# Patient Record
Sex: MALE | Race: WHITE | Employment: OTHER | URBAN - METROPOLITAN AREA
[De-identification: names, ages, dates, MRNs, and addresses within clinical notes are randomized per-mention and may not be internally consistent; named-entity substitution may affect disease eponyms.]

---

## 2022-07-11 ENCOUNTER — OFFICE VISIT (OUTPATIENT)
Dept: FAMILY MEDICINE | Facility: CLINIC | Age: 67
End: 2022-07-11
Payer: MEDICARE

## 2022-07-11 VITALS
SYSTOLIC BLOOD PRESSURE: 116 MMHG | DIASTOLIC BLOOD PRESSURE: 74 MMHG | HEART RATE: 68 BPM | WEIGHT: 208 LBS | OXYGEN SATURATION: 98 % | RESPIRATION RATE: 16 BRPM | TEMPERATURE: 98.1 F | BODY MASS INDEX: 29.12 KG/M2 | HEIGHT: 71 IN

## 2022-07-11 DIAGNOSIS — E11.59 TYPE 2 DIABETES MELLITUS WITH OTHER CIRCULATORY COMPLICATION, WITHOUT LONG-TERM CURRENT USE OF INSULIN (H): Primary | ICD-10-CM

## 2022-07-11 DIAGNOSIS — F41.9 ANXIETY: ICD-10-CM

## 2022-07-11 DIAGNOSIS — K21.00 GASTROESOPHAGEAL REFLUX DISEASE WITH ESOPHAGITIS WITHOUT HEMORRHAGE: ICD-10-CM

## 2022-07-11 DIAGNOSIS — E78.2 MIXED HYPERLIPIDEMIA: ICD-10-CM

## 2022-07-11 DIAGNOSIS — Z72.0 TOBACCO USE: ICD-10-CM

## 2022-07-11 DIAGNOSIS — I25.810 CORONARY ARTERY DISEASE INVOLVING CORONARY BYPASS GRAFT OF NATIVE HEART WITHOUT ANGINA PECTORIS: ICD-10-CM

## 2022-07-11 DIAGNOSIS — I10 HYPERTENSION, UNSPECIFIED TYPE: ICD-10-CM

## 2022-07-11 LAB
ANION GAP SERPL CALCULATED.3IONS-SCNC: 1 MMOL/L (ref 3–14)
BUN SERPL-MCNC: 25 MG/DL (ref 7–30)
CALCIUM SERPL-MCNC: 9.3 MG/DL (ref 8.5–10.1)
CHLORIDE BLD-SCNC: 108 MMOL/L (ref 94–109)
CHOLEST SERPL-MCNC: 193 MG/DL
CO2 SERPL-SCNC: 29 MMOL/L (ref 20–32)
CREAT SERPL-MCNC: 0.92 MG/DL (ref 0.66–1.25)
CREAT UR-MCNC: 57 MG/DL
FASTING STATUS PATIENT QL REPORTED: YES
GFR SERPL CREATININE-BSD FRML MDRD: >90 ML/MIN/1.73M2
GLUCOSE BLD-MCNC: 147 MG/DL (ref 70–99)
HBA1C MFR BLD: 6.8 % (ref 0–5.6)
HDLC SERPL-MCNC: 35 MG/DL
LDLC SERPL CALC-MCNC: 100 MG/DL
MICROALBUMIN UR-MCNC: 10 MG/L
MICROALBUMIN/CREAT UR: 17.54 MG/G CR (ref 0–17)
NONHDLC SERPL-MCNC: 158 MG/DL
POTASSIUM BLD-SCNC: 4.4 MMOL/L (ref 3.4–5.3)
SODIUM SERPL-SCNC: 138 MMOL/L (ref 133–144)
TRIGL SERPL-MCNC: 291 MG/DL
TSH SERPL DL<=0.005 MIU/L-ACNC: 1.27 MU/L (ref 0.4–4)

## 2022-07-11 PROCEDURE — 82043 UR ALBUMIN QUANTITATIVE: CPT | Performed by: FAMILY MEDICINE

## 2022-07-11 PROCEDURE — 99204 OFFICE O/P NEW MOD 45 MIN: CPT | Performed by: FAMILY MEDICINE

## 2022-07-11 PROCEDURE — 83036 HEMOGLOBIN GLYCOSYLATED A1C: CPT | Performed by: FAMILY MEDICINE

## 2022-07-11 PROCEDURE — 84443 ASSAY THYROID STIM HORMONE: CPT | Performed by: FAMILY MEDICINE

## 2022-07-11 PROCEDURE — 36415 COLL VENOUS BLD VENIPUNCTURE: CPT | Performed by: FAMILY MEDICINE

## 2022-07-11 PROCEDURE — 80048 BASIC METABOLIC PNL TOTAL CA: CPT | Performed by: FAMILY MEDICINE

## 2022-07-11 PROCEDURE — 80061 LIPID PANEL: CPT | Performed by: FAMILY MEDICINE

## 2022-07-11 RX ORDER — CARVEDILOL 25 MG/1
25 TABLET ORAL 2 TIMES DAILY
Qty: 180 TABLET | Refills: 3 | Status: SHIPPED | OUTPATIENT
Start: 2022-07-11 | End: 2023-06-26

## 2022-07-11 RX ORDER — SERTRALINE HYDROCHLORIDE 100 MG/1
TABLET, FILM COATED ORAL
Qty: 180 TABLET | Refills: 3 | Status: SHIPPED | OUTPATIENT
Start: 2022-07-11 | End: 2023-06-26

## 2022-07-11 RX ORDER — ATORVASTATIN CALCIUM 20 MG/1
20 TABLET, FILM COATED ORAL DAILY
COMMUNITY
Start: 2022-04-18 | End: 2022-07-11

## 2022-07-11 RX ORDER — METFORMIN HCL 500 MG
TABLET, EXTENDED RELEASE 24 HR ORAL
COMMUNITY
Start: 2022-04-18 | End: 2022-07-11

## 2022-07-11 RX ORDER — LANSOPRAZOLE 30 MG/1
CAPSULE, DELAYED RELEASE ORAL
COMMUNITY
Start: 2022-06-29 | End: 2022-07-11

## 2022-07-11 RX ORDER — ATORVASTATIN CALCIUM 20 MG/1
20 TABLET, FILM COATED ORAL DAILY
Qty: 90 TABLET | Refills: 3 | Status: SHIPPED | OUTPATIENT
Start: 2022-07-11 | End: 2023-06-26

## 2022-07-11 RX ORDER — CARVEDILOL 25 MG/1
25 TABLET ORAL 2 TIMES DAILY
COMMUNITY
Start: 2022-05-13 | End: 2022-07-11

## 2022-07-11 RX ORDER — SERTRALINE HYDROCHLORIDE 100 MG/1
TABLET, FILM COATED ORAL
COMMUNITY
Start: 2022-05-13 | End: 2022-07-11

## 2022-07-11 RX ORDER — LANSOPRAZOLE 30 MG/1
30 CAPSULE, DELAYED RELEASE ORAL DAILY
Qty: 90 CAPSULE | Refills: 3 | Status: SHIPPED | OUTPATIENT
Start: 2022-07-11 | End: 2023-06-26

## 2022-07-11 RX ORDER — METFORMIN HCL 500 MG
TABLET, EXTENDED RELEASE 24 HR ORAL
Qty: 180 TABLET | Refills: 3 | Status: SHIPPED | OUTPATIENT
Start: 2022-07-11 | End: 2023-06-26

## 2022-07-11 ASSESSMENT — ANXIETY QUESTIONNAIRES
2. NOT BEING ABLE TO STOP OR CONTROL WORRYING: NOT AT ALL
GAD7 TOTAL SCORE: 2
GAD7 TOTAL SCORE: 2
7. FEELING AFRAID AS IF SOMETHING AWFUL MIGHT HAPPEN: NOT AT ALL
GAD7 TOTAL SCORE: 2
4. TROUBLE RELAXING: SEVERAL DAYS
7. FEELING AFRAID AS IF SOMETHING AWFUL MIGHT HAPPEN: NOT AT ALL
5. BEING SO RESTLESS THAT IT IS HARD TO SIT STILL: NOT AT ALL
1. FEELING NERVOUS, ANXIOUS, OR ON EDGE: NOT AT ALL
3. WORRYING TOO MUCH ABOUT DIFFERENT THINGS: NOT AT ALL
6. BECOMING EASILY ANNOYED OR IRRITABLE: SEVERAL DAYS
8. IF YOU CHECKED OFF ANY PROBLEMS, HOW DIFFICULT HAVE THESE MADE IT FOR YOU TO DO YOUR WORK, TAKE CARE OF THINGS AT HOME, OR GET ALONG WITH OTHER PEOPLE?: NOT DIFFICULT AT ALL

## 2022-07-11 ASSESSMENT — PATIENT HEALTH QUESTIONNAIRE - PHQ9
SUM OF ALL RESPONSES TO PHQ QUESTIONS 1-9: 1
10. IF YOU CHECKED OFF ANY PROBLEMS, HOW DIFFICULT HAVE THESE PROBLEMS MADE IT FOR YOU TO DO YOUR WORK, TAKE CARE OF THINGS AT HOME, OR GET ALONG WITH OTHER PEOPLE: NOT DIFFICULT AT ALL
SUM OF ALL RESPONSES TO PHQ QUESTIONS 1-9: 1

## 2022-07-11 ASSESSMENT — PAIN SCALES - GENERAL: PAINLEVEL: NO PAIN (0)

## 2022-07-11 NOTE — LETTER
July 11, 2022      Titus Gordon  [address] [place] [address]        Dear ,    We are writing to inform you of your test results.      Labs stable.  We can discuss things in more detail when I see you back in 3-4 months.     Resulted Orders   Albumin Random Urine Quantitative with Creat Ratio   Result Value Ref Range    Creatinine Urine mg/dL 57 mg/dL    Albumin Urine mg/L 10 mg/L    Albumin Urine mg/g Cr 17.54 (H) 0.00 - 17.00 mg/g Cr   TSH with free T4 reflex   Result Value Ref Range    TSH 1.27 0.40 - 4.00 mU/L   Lipid panel reflex to direct LDL Fasting   Result Value Ref Range    Cholesterol 193 <200 mg/dL    Triglycerides 291 (H) <150 mg/dL    Direct Measure HDL 35 (L) >=40 mg/dL    LDL Cholesterol Calculated 100 <=100 mg/dL    Non HDL Cholesterol 158 (H) <130 mg/dL    Patient Fasting > 8hrs? Yes     Narrative    Cholesterol  Desirable:  <200 mg/dL    Triglycerides  Normal:  Less than 150 mg/dL  Borderline High:  150-199 mg/dL  High:  200-499 mg/dL  Very High:  Greater than or equal to 500 mg/dL    Direct Measure HDL  Female:  Greater than or equal to 50 mg/dL   Male:  Greater than or equal to 40 mg/dL    LDL Cholesterol  Desirable:  <100mg/dL  Above Desirable:  100-129 mg/dL   Borderline High:  130-159 mg/dL   High:  160-189 mg/dL   Very High:  >= 190 mg/dL    Non HDL Cholesterol  Desirable:  130 mg/dL  Above Desirable:  130-159 mg/dL  Borderline High:  160-189 mg/dL  High:  190-219 mg/dL  Very High:  Greater than or equal to 220 mg/dL   Basic metabolic panel  (Ca, Cl, CO2, Creat, Gluc, K, Na, BUN)   Result Value Ref Range    Sodium 138 133 - 144 mmol/L    Potassium 4.4 3.4 - 5.3 mmol/L    Chloride 108 94 - 109 mmol/L    Carbon Dioxide (CO2) 29 20 - 32 mmol/L    Anion Gap 1 (L) 3 - 14 mmol/L    Urea Nitrogen 25 7 - 30 mg/dL    Creatinine 0.92 0.66 - 1.25 mg/dL    Calcium 9.3 8.5 - 10.1 mg/dL    Glucose 147 (H) 70 - 99 mg/dL    GFR Estimate >90 >60 mL/min/1.73m2      Comment:      Effective  December 21, 2021 eGFRcr in adults is calculated using the 2021 CKD-EPI creatinine equation which includes age and gender (Mackenzie et al., NEJM, DOI: 10.1056/LJKCuk7135969)   Hemoglobin A1c   Result Value Ref Range    Hemoglobin A1C 6.8 (H) 0.0 - 5.6 %      Comment:      Normal <5.7%   Prediabetes 5.7-6.4%    Diabetes 6.5% or higher     Note: Adopted from ADA consensus guidelines.       If you have any questions or concerns, please call the clinic at the number listed above.       Sincerely,      Vinicius Rick MD

## 2022-07-11 NOTE — PROGRESS NOTES
"  .  ..     S :Titus Gordon is a 67 year old male, new here, establishing care    CAD: bypass 2013.  No issues since.  BP good, on statin.  ASA daily  Hyperlipidemia: stable on statin  Anxiety: improved on chronic zoloft  Gerd: chronic PPI  DM2: metformin only.  Recheck A1C.  Working on wt loss  Tobacco use: mild, quitting soon  Htn: stable on meds      O:/74   Pulse 68   Temp 98.1  F (36.7  C) (Tympanic)   Resp 16   Ht 1.791 m (5' 10.5\")   Wt 94.3 kg (208 lb)   SpO2 98%   BMI 29.42 kg/m    GEN: Alert and oriented, in no acute distress  CV: RRR, no murmur  RESP: lungs clear bilaterally, good effort  EXT: no edema or lesions noted in lower extremities    A: CAD: bypass 2013.  No issues since.    Hyperlipidemia: stable on statin  Anxiety: improved on chronic zoloft  Gerd: chronic PPI, stable   DM2: metformin only.  Recheck A1C.    Tobacco use: mild, quitting soon  Htn: stable on meds    P: fills.  Labs.  Back in 3 months.  Getting cologuard records.  Is going to quit smoking    Weight management plan: diet/exercise    Tobacco Cessation Action Plan: quitting soon           "

## 2022-10-12 ENCOUNTER — OFFICE VISIT (OUTPATIENT)
Dept: FAMILY MEDICINE | Facility: CLINIC | Age: 67
End: 2022-10-12
Payer: MEDICARE

## 2022-10-12 VITALS
TEMPERATURE: 98.5 F | OXYGEN SATURATION: 98 % | HEIGHT: 71 IN | HEART RATE: 70 BPM | DIASTOLIC BLOOD PRESSURE: 70 MMHG | BODY MASS INDEX: 30.52 KG/M2 | WEIGHT: 218 LBS | RESPIRATION RATE: 16 BRPM | SYSTOLIC BLOOD PRESSURE: 120 MMHG

## 2022-10-12 DIAGNOSIS — I10 HYPERTENSION, UNSPECIFIED TYPE: ICD-10-CM

## 2022-10-12 DIAGNOSIS — Z12.5 SCREENING FOR PROSTATE CANCER: ICD-10-CM

## 2022-10-12 DIAGNOSIS — E11.59 TYPE 2 DIABETES MELLITUS WITH OTHER CIRCULATORY COMPLICATION, WITHOUT LONG-TERM CURRENT USE OF INSULIN (H): Primary | ICD-10-CM

## 2022-10-12 DIAGNOSIS — Z72.0 TOBACCO USE: ICD-10-CM

## 2022-10-12 LAB — HBA1C MFR BLD: 7.5 % (ref 0–5.6)

## 2022-10-12 PROCEDURE — 99214 OFFICE O/P EST MOD 30 MIN: CPT | Performed by: FAMILY MEDICINE

## 2022-10-12 PROCEDURE — 36415 COLL VENOUS BLD VENIPUNCTURE: CPT | Performed by: FAMILY MEDICINE

## 2022-10-12 PROCEDURE — 83036 HEMOGLOBIN GLYCOSYLATED A1C: CPT | Performed by: FAMILY MEDICINE

## 2022-10-12 ASSESSMENT — PAIN SCALES - GENERAL: PAINLEVEL: NO PAIN (0)

## 2022-10-12 NOTE — LETTER
October 12, 2022      Titus BRI Gordon  [address]   [place] [address]        Dear ,    We are writing to inform you of your test results.    A1C took a little jump, so keep working hard on being active, eating healthy foods, and keeping the weight down     We'll recheck in 4-5 months and see how things look, may need to increase the metformin if going in the wrong direction.         Resulted Orders   HEMOGLOBIN A1C   Result Value Ref Range    Hemoglobin A1C 7.5 (H) 0.0 - 5.6 %      Comment:      Normal <5.7%   Prediabetes 5.7-6.4%    Diabetes 6.5% or higher     Note: Adopted from ADA consensus guidelines.       If you have any questions or concerns, please call the clinic at the number listed above.       Sincerely,      Vinicius Rick MD

## 2022-10-12 NOTE — PROGRESS NOTES
"S: Titus Gordon is a 67 year old male with     DM2: mild microalbmuin, hx CAD.  Doing well.  Recheck A1C.  Htn: stable, continue meds  Tobacco use: ongoing, getting ready to quit     Does oguard through [employer], in study,  Will bring in results  Has been negative    O:/70   Pulse 70   Temp 98.5  F (36.9  C) (Tympanic)   Resp 16   Ht 1.791 m (5' 10.5\")   Wt 98.9 kg (218 lb)   SpO2 98%   BMI 30.84 kg/m    GEN: Alert and oriented, in no acute distress  CV: RRR, no murmur  RESP: lungs clear bilaterally, good effort  EXT: no edema or lesions noted in lower extremities    A: DM2: mild microalbmuin, recheck  Htn: stable, continue meds  Tobacco use: ongoing    P: update A1C.  Counseled on cutting down.  Back in 4-5 months, getting him on q6 mo visit schedule.     Discussed risks and benefits of PSA screening with the patient, attempting to engage in shared decision making.  Discussed the high likelihood of further invasive evaluation if the PSA is elevated.  Discussed inability to reliably distinguish aggressive from indolent cancer on biopsy. Explained that PSA screeing and aggressive treatment for biopsy confirmed cancer may extend life in those patients harboring an aggressive form of prostate cancer, but will overtreat the overwhelming majority of men because of the high rate of indolent cancer.  We discussed that treatment of prostate cancer is not benign, and that significant side effects are a real possibility.  We discussed current USPSTF recommendations against any routine screening for prostate cancer.                 Given the above information, the patient elected not to procede with PSA screeing at this time.  He understands he can revisit the discussion at any time.       Weight management plan: .dietsize    Tobacco Cessation Action Plan: working on cutting down      "

## 2023-02-06 ENCOUNTER — TELEPHONE (OUTPATIENT)
Dept: FAMILY MEDICINE | Facility: CLINIC | Age: 68
End: 2023-02-06
Payer: MEDICARE

## 2023-02-06 NOTE — TELEPHONE ENCOUNTER
Patient Quality Outreach    Patient is due for the following:   Diabetes -  Eye Exam and Diabetic Follow-Up Visit  Colon Cancer Screening    Next Steps:   Schedule a office visit for Diabtes     Type of outreach:    Phone, left message for patient/parent to call back.      Questions for provider review:    None     Laura López MA

## 2023-02-13 ENCOUNTER — OFFICE VISIT (OUTPATIENT)
Dept: FAMILY MEDICINE | Facility: CLINIC | Age: 68
End: 2023-02-13
Payer: MEDICARE

## 2023-02-13 VITALS
RESPIRATION RATE: 16 BRPM | HEART RATE: 74 BPM | HEIGHT: 71 IN | BODY MASS INDEX: 29.96 KG/M2 | WEIGHT: 214 LBS | OXYGEN SATURATION: 98 % | DIASTOLIC BLOOD PRESSURE: 82 MMHG | SYSTOLIC BLOOD PRESSURE: 138 MMHG | TEMPERATURE: 98.5 F

## 2023-02-13 DIAGNOSIS — E11.59 TYPE 2 DIABETES MELLITUS WITH OTHER CIRCULATORY COMPLICATION, WITHOUT LONG-TERM CURRENT USE OF INSULIN (H): Primary | ICD-10-CM

## 2023-02-13 DIAGNOSIS — Z12.11 SPECIAL SCREENING FOR MALIGNANT NEOPLASMS, COLON: ICD-10-CM

## 2023-02-13 DIAGNOSIS — K21.00 GASTROESOPHAGEAL REFLUX DISEASE WITH ESOPHAGITIS WITHOUT HEMORRHAGE: ICD-10-CM

## 2023-02-13 DIAGNOSIS — E78.2 MIXED HYPERLIPIDEMIA: ICD-10-CM

## 2023-02-13 DIAGNOSIS — I25.810 CORONARY ARTERY DISEASE INVOLVING CORONARY BYPASS GRAFT OF NATIVE HEART WITHOUT ANGINA PECTORIS: ICD-10-CM

## 2023-02-13 DIAGNOSIS — F10.21 HISTORY OF ALCOHOLISM (H): ICD-10-CM

## 2023-02-13 DIAGNOSIS — F41.9 ANXIETY: ICD-10-CM

## 2023-02-13 DIAGNOSIS — I10 HYPERTENSION, UNSPECIFIED TYPE: ICD-10-CM

## 2023-02-13 LAB — HBA1C MFR BLD: 7.5 % (ref 0–5.6)

## 2023-02-13 PROCEDURE — 36415 COLL VENOUS BLD VENIPUNCTURE: CPT | Performed by: FAMILY MEDICINE

## 2023-02-13 PROCEDURE — 99214 OFFICE O/P EST MOD 30 MIN: CPT | Performed by: FAMILY MEDICINE

## 2023-02-13 PROCEDURE — 83036 HEMOGLOBIN GLYCOSYLATED A1C: CPT | Performed by: FAMILY MEDICINE

## 2023-02-13 ASSESSMENT — PAIN SCALES - GENERAL: PAINLEVEL: NO PAIN (0)

## 2023-02-13 NOTE — PROGRESS NOTES
"S: Titus Gordon is a 67 year old male with DM2, 500mg bid metformin. Recheck    Some neuropathy, hx cad and bypass as well    CAD: hx bypass.  No cp or sob    Htn: stable on meds    Hyperlipidemia: statin, stable    Tobacco use: ongoing, cutting down    Anxiety: better on zoloft, fills    Gerd: chronic PPI, stable    Hx alcoholism: AA since 2020, sober.  doing well.      O:/82   Pulse 74   Temp 98.5  F (36.9  C) (Tympanic)   Resp 16   Ht 1.791 m (5' 10.5\")   Wt 97.1 kg (214 lb)   SpO2 98%   BMI 30.27 kg/m    GEN: Alert and oriented, in no acute distress  CV: RRR, no murmur  RESP: lungs clear bilaterally, good effort  EXT: no edema or lesions noted in lower extremities    A:  DM2, . Recheck, Some neuropathy, hx cad and bypass as well  CAD: hx bypass.  No cp or sob  Htn: stable on meds  Hyperlipidemia: statin, stable  Tobacco use: ongoing, cutting down  Anxiety: better on zoloft, fills  Gerd: chronic PPI, stable      P: update A1C.  Willing to do FIT.  Keep working on cutting down.      Tobacco Cessation Action Plan: cutting down      Answers for HPI/ROS submitted by the patient on 2/13/2023  Do you check your blood pressure regularly outside of the clinic?: No  Are your blood pressures ever more than 140 on the top number (systolic) OR more than 90 on the bottom number (diastolic)? (For example, greater than 140/90): No  Are you following a low salt diet?: Yes  Frequency of checking blood sugars:: not at all  Diabetic concerns:: none  Paraesthesia present:: numbness in feet, blurry vision  Have you had a diabetic eye exam within the last year?: No  Nitroglycerin use:: never  Do you take an aspirin every day?: Yes      "

## 2023-02-13 NOTE — LETTER
February 17, 2023      Titus W Heidi  [address] [place] [address]        Dear ,    We are writing to inform you of your test results.    Stool blood test showed no blood.  That's good news and makes it less likely you would have a colon cancer.  As we talked about, however, colonoscopy is still recommended as it's a better test.  If you change your mind and would like to get a colonoscopy, you can call in any time and we can coordinate things over the phone.         Resulted Orders   Fecal colorectal cancer screen (FIT)   Result Value Ref Range    Occult Blood Screen FIT Negative Negative   HEMOGLOBIN A1C   Result Value Ref Range    Hemoglobin A1C 7.5 (H) 0.0 - 5.6 %      Comment:      Normal <5.7%   Prediabetes 5.7-6.4%    Diabetes 6.5% or higher     Note: Adopted from ADA consensus guidelines.       If you have any questions or concerns, please call the clinic at the number listed above.       Sincerely,      Vinicius Rick MD

## 2023-02-13 NOTE — LETTER
February 13, 2023      Titus Gordon  [address]   [place] [address]        Dear ,    We are writing to inform you of your test results.    Diabetes holding steady.    Resulted Orders   HEMOGLOBIN A1C   Result Value Ref Range    Hemoglobin A1C 7.5 (H) 0.0 - 5.6 %      Comment:      Normal <5.7%   Prediabetes 5.7-6.4%    Diabetes 6.5% or higher     Note: Adopted from ADA consensus guidelines.       If you have any questions or concerns, please call the clinic at the number listed above.       Sincerely,      Vinicius Rick MD

## 2023-02-16 LAB — HEMOCCULT STL QL IA: NEGATIVE

## 2023-02-16 PROCEDURE — 82274 ASSAY TEST FOR BLOOD FECAL: CPT | Performed by: FAMILY MEDICINE

## 2023-05-01 ENCOUNTER — TELEPHONE (OUTPATIENT)
Dept: FAMILY MEDICINE | Facility: CLINIC | Age: 68
End: 2023-05-01
Payer: MEDICARE

## 2023-05-01 NOTE — TELEPHONE ENCOUNTER
Patient Quality Outreach    Patient is due for the following:   Diabetes -  Diabetic Follow-Up Visit    Next Steps:   Schedule a office visit for diabets     Type of outreach:    Phone, left message for patient/parent to call back.      Questions for provider review:    None     Laura López MA

## 2023-05-15 ENCOUNTER — TRANSFERRED RECORDS (OUTPATIENT)
Dept: HEALTH INFORMATION MANAGEMENT | Facility: CLINIC | Age: 68
End: 2023-05-15

## 2023-05-23 ENCOUNTER — OFFICE VISIT (OUTPATIENT)
Dept: FAMILY MEDICINE | Facility: CLINIC | Age: 68
End: 2023-05-23
Payer: MEDICARE

## 2023-05-23 VITALS
DIASTOLIC BLOOD PRESSURE: 58 MMHG | RESPIRATION RATE: 16 BRPM | WEIGHT: 207 LBS | TEMPERATURE: 97.9 F | OXYGEN SATURATION: 95 % | SYSTOLIC BLOOD PRESSURE: 100 MMHG | HEART RATE: 66 BPM | BODY MASS INDEX: 28.98 KG/M2 | HEIGHT: 71 IN

## 2023-05-23 DIAGNOSIS — E78.1 HYPERTRIGLYCERIDEMIA: ICD-10-CM

## 2023-05-23 DIAGNOSIS — I25.810 CORONARY ARTERY DISEASE INVOLVING CORONARY BYPASS GRAFT OF NATIVE HEART WITHOUT ANGINA PECTORIS: ICD-10-CM

## 2023-05-23 DIAGNOSIS — E11.59 TYPE 2 DIABETES MELLITUS WITH OTHER CIRCULATORY COMPLICATION, WITHOUT LONG-TERM CURRENT USE OF INSULIN (H): ICD-10-CM

## 2023-05-23 DIAGNOSIS — E78.5 HYPERLIPIDEMIA LDL GOAL <70: ICD-10-CM

## 2023-05-23 DIAGNOSIS — I25.719 CORONARY ARTERY DISEASE INVOLVING AUTOLOGOUS VEIN CORONARY BYPASS GRAFT WITH ANGINA PECTORIS (H): Primary | ICD-10-CM

## 2023-05-23 LAB
ALBUMIN SERPL BCG-MCNC: 4.9 G/DL (ref 3.5–5.2)
ALP SERPL-CCNC: 87 U/L (ref 40–129)
ALT SERPL W P-5'-P-CCNC: 27 U/L (ref 10–50)
ANION GAP SERPL CALCULATED.3IONS-SCNC: 14 MMOL/L (ref 7–15)
AST SERPL W P-5'-P-CCNC: 20 U/L (ref 10–50)
BILIRUB SERPL-MCNC: 0.4 MG/DL
BUN SERPL-MCNC: 27.9 MG/DL (ref 8–23)
CALCIUM SERPL-MCNC: 9.3 MG/DL (ref 8.8–10.2)
CHLORIDE SERPL-SCNC: 105 MMOL/L (ref 98–107)
CREAT SERPL-MCNC: 1.2 MG/DL (ref 0.67–1.17)
DEPRECATED HCO3 PLAS-SCNC: 20 MMOL/L (ref 22–29)
GFR SERPL CREATININE-BSD FRML MDRD: 66 ML/MIN/1.73M2
GLUCOSE SERPL-MCNC: 119 MG/DL (ref 70–99)
HBA1C MFR BLD: 7.1 % (ref 0–5.6)
POTASSIUM SERPL-SCNC: 4.5 MMOL/L (ref 3.4–5.3)
PROT SERPL-MCNC: 7.3 G/DL (ref 6.4–8.3)
SODIUM SERPL-SCNC: 139 MMOL/L (ref 136–145)

## 2023-05-23 PROCEDURE — 36415 COLL VENOUS BLD VENIPUNCTURE: CPT | Performed by: STUDENT IN AN ORGANIZED HEALTH CARE EDUCATION/TRAINING PROGRAM

## 2023-05-23 PROCEDURE — 99214 OFFICE O/P EST MOD 30 MIN: CPT | Performed by: STUDENT IN AN ORGANIZED HEALTH CARE EDUCATION/TRAINING PROGRAM

## 2023-05-23 PROCEDURE — 80053 COMPREHEN METABOLIC PANEL: CPT | Performed by: STUDENT IN AN ORGANIZED HEALTH CARE EDUCATION/TRAINING PROGRAM

## 2023-05-23 PROCEDURE — 83036 HEMOGLOBIN GLYCOSYLATED A1C: CPT | Performed by: STUDENT IN AN ORGANIZED HEALTH CARE EDUCATION/TRAINING PROGRAM

## 2023-05-23 RX ORDER — PREDNISOLONE ACETATE 10 MG/ML
SUSPENSION/ DROPS OPHTHALMIC
COMMUNITY
Start: 2023-04-21 | End: 2023-08-01

## 2023-05-23 RX ORDER — CLOPIDOGREL BISULFATE 75 MG/1
75 TABLET ORAL
COMMUNITY
Start: 2023-05-18 | End: 2023-06-26

## 2023-05-23 RX ORDER — LISINOPRIL 2.5 MG/1
2.5 TABLET ORAL
COMMUNITY
Start: 2023-05-17 | End: 2023-06-16

## 2023-05-23 RX ORDER — ROSUVASTATIN CALCIUM 10 MG/1
20 TABLET, COATED ORAL
COMMUNITY
Start: 2023-05-17 | End: 2023-06-26

## 2023-05-23 RX ORDER — OFLOXACIN 3 MG/ML
SOLUTION/ DROPS OPHTHALMIC
COMMUNITY
Start: 2023-04-21 | End: 2023-08-01

## 2023-05-23 ASSESSMENT — PAIN SCALES - GENERAL: PAINLEVEL: NO PAIN (0)

## 2023-05-23 NOTE — PROGRESS NOTES
Assessment & Plan     Coronary artery disease involving autologous vein coronary bypass graft with angina pectoris (H)  Coronary artery disease involving coronary bypass graft of native heart without angina pectoris  Seen in [place] for acute MI of previous CABG vein. S/p 2 TRA placement. Danial Millard medtonic TRA. Will scan card with lot information. Will refer to cardiology to establish care here. Started on low dose lisinopril, aspirin, plavix. Switched from atorvastatin to rosuvastatin. Supposed to be started on zetia, but doesn't appear to have been ordered on discharge.   - Adult Cardiology Eval  Referral    Type 2 diabetes mellitus with other circulatory complication, without long-term current use of insulin (H)  Due for a1c. A1c improved to 7.1 from 7.5 previously. Still not quite at goal. Follow up with PCP as previously scheduled.   - HEMOGLOBIN A1C    Hyperlipidemia LDL goal <70  Hypertriglyceridemia  Has mixed hyperlipidemia/hypertrigs. Has previously been on lipitor, but states he had elevated trigs with that. However, appears he is still on atorvastatin. During hospital stay, was switched to crestor. For now, will keep on crestor. Regardless, will ideally have higher dose given elevated lipids completed at outside facility. Also was to be started on Zetia at hospital stay, but doesn't appear this had happened. For now, will defer to cardiology. CMP completed today to follow up on renal function given recent dye exposure, as well as to monitor LFTs on statins.     Lipid result from recent hospital stay:   Trigs: 951  Cholesterol: 221  HDL 27  LDL 93    - Adult Cardiology Eval  Referral  - Comprehensive metabolic panel      Review of prior external note(s) from - CareEverywhere information from [facility] reviewed  Review of the result(s) of each unique test - 5/16/23 and 5/17/23 labs from [facility] - lipid, CBC, BMP  I spent a total of 35 minutes on the day  "of the visit.   Time spent by me doing chart review, history and exam, documentation and further activities per the note    MED REC REQUIRED  Post Medication Reconciliation Status:       BMI:   Estimated body mass index is 29.28 kg/m  as calculated from the following:    Height as of this encounter: 1.791 m (5' 10.5\").    Weight as of this encounter: 93.9 kg (207 lb).     Silvia Raymond MD  [facility]    Josh Qureshi is a 68 year old, presenting for the following health issues:  Chief Complaint   Patient presents with     Hospital F/U     [facility]   Hospital Follow-up Visit:  Hospital/Nursing Home/IP Rehab Facility: [facility]  Date of Admission: 05/15/2023  Date of Discharge: 05/17/2023  Reason(s) for Admission: NSTEMI    Was your hospitalization related to COVID-19? No   Problems taking medications regularly:  None  Medication changes since discharge: None  Problems adhering to non-medication therapy:  None    Summary of hospitalization:  CareEverywhere information obtained and reviewed  Diagnostic Tests/Treatments reviewed.  Follow up needed: [facility]  Other Healthcare Providers Involved in Patient s Care:         None  Update since discharge: improved. With Lipitor had elevated LFTs. This was 20 years ago       Plan of care communicated with patient     Review of Systems   Constitutional, HEENT, cardiovascular, pulmonary, GI, , musculoskeletal, neuro, skin, endocrine and psych systems are negative, except as otherwise noted.      Objective    /58 (BP Location: Right arm, Patient Position: Sitting, Cuff Size: Adult Regular)   Pulse 66   Temp 97.9  F (36.6  C) (Tympanic)   Resp 16   Ht 1.791 m (5' 10.5\")   Wt 93.9 kg (207 lb)   SpO2 95%   BMI 29.28 kg/m    Body mass index is 29.28 kg/m .  Physical Exam  Constitutional:       Appearance: Normal appearance.   HENT:      Head: Normocephalic.   Eyes:      General: No scleral icterus.     Extraocular Movements: " Extraocular movements intact.      Conjunctiva/sclera: Conjunctivae normal.   Cardiovascular:      Rate and Rhythm: Normal rate and regular rhythm.   Pulmonary:      Effort: Pulmonary effort is normal.      Breath sounds: Normal breath sounds.   Musculoskeletal:      Right lower leg: No edema.      Left lower leg: No edema.   Neurological:      General: No focal deficit present.      Mental Status: He is alert and oriented to person, place, and time.             Results from this visit  Results for orders placed or performed in visit on 05/23/23   HEMOGLOBIN A1C     Status: Abnormal   Result Value Ref Range    Hemoglobin A1C 7.1 (H) 0.0 - 5.6 %

## 2023-06-01 ENCOUNTER — HEALTH MAINTENANCE LETTER (OUTPATIENT)
Age: 68
End: 2023-06-01

## 2023-06-01 ENCOUNTER — TRANSFERRED RECORDS (OUTPATIENT)
Dept: MULTI SPECIALTY CLINIC | Facility: CLINIC | Age: 68
End: 2023-06-01

## 2023-06-01 LAB — RETINOPATHY: NORMAL

## 2023-06-26 ENCOUNTER — OFFICE VISIT (OUTPATIENT)
Dept: FAMILY MEDICINE | Facility: CLINIC | Age: 68
End: 2023-06-26
Payer: MEDICARE

## 2023-06-26 VITALS
BODY MASS INDEX: 29.12 KG/M2 | OXYGEN SATURATION: 98 % | DIASTOLIC BLOOD PRESSURE: 76 MMHG | RESPIRATION RATE: 16 BRPM | SYSTOLIC BLOOD PRESSURE: 128 MMHG | WEIGHT: 208 LBS | HEIGHT: 71 IN | HEART RATE: 76 BPM | TEMPERATURE: 98.5 F

## 2023-06-26 DIAGNOSIS — K21.00 GASTROESOPHAGEAL REFLUX DISEASE WITH ESOPHAGITIS WITHOUT HEMORRHAGE: ICD-10-CM

## 2023-06-26 DIAGNOSIS — F10.21 HISTORY OF ALCOHOLISM (H): ICD-10-CM

## 2023-06-26 DIAGNOSIS — E78.2 MIXED HYPERLIPIDEMIA: ICD-10-CM

## 2023-06-26 DIAGNOSIS — Z72.0 TOBACCO USE: ICD-10-CM

## 2023-06-26 DIAGNOSIS — E11.59 TYPE 2 DIABETES MELLITUS WITH OTHER CIRCULATORY COMPLICATION, WITHOUT LONG-TERM CURRENT USE OF INSULIN (H): Primary | ICD-10-CM

## 2023-06-26 DIAGNOSIS — I10 HYPERTENSION, UNSPECIFIED TYPE: ICD-10-CM

## 2023-06-26 DIAGNOSIS — F41.9 ANXIETY: ICD-10-CM

## 2023-06-26 DIAGNOSIS — I25.810 CORONARY ARTERY DISEASE INVOLVING CORONARY BYPASS GRAFT OF NATIVE HEART WITHOUT ANGINA PECTORIS: ICD-10-CM

## 2023-06-26 PROCEDURE — 99214 OFFICE O/P EST MOD 30 MIN: CPT | Performed by: FAMILY MEDICINE

## 2023-06-26 RX ORDER — METFORMIN HCL 500 MG
TABLET, EXTENDED RELEASE 24 HR ORAL
Qty: 180 TABLET | Refills: 3 | Status: SHIPPED | OUTPATIENT
Start: 2023-06-26 | End: 2024-09-09

## 2023-06-26 RX ORDER — CARVEDILOL 25 MG/1
25 TABLET ORAL 2 TIMES DAILY
Qty: 180 TABLET | Refills: 3 | Status: SHIPPED | OUTPATIENT
Start: 2023-06-26 | End: 2024-06-24

## 2023-06-26 RX ORDER — LANSOPRAZOLE 30 MG/1
30 CAPSULE, DELAYED RELEASE ORAL DAILY
Qty: 90 CAPSULE | Refills: 3 | Status: SHIPPED | OUTPATIENT
Start: 2023-06-26 | End: 2023-10-03

## 2023-06-26 RX ORDER — SERTRALINE HYDROCHLORIDE 100 MG/1
TABLET, FILM COATED ORAL
Qty: 180 TABLET | Refills: 3 | Status: SHIPPED | OUTPATIENT
Start: 2023-06-26

## 2023-06-26 RX ORDER — CLOPIDOGREL BISULFATE 75 MG/1
75 TABLET ORAL DAILY
Qty: 90 TABLET | Refills: 3 | Status: SHIPPED | OUTPATIENT
Start: 2023-06-26 | End: 2023-08-14

## 2023-06-26 RX ORDER — ATORVASTATIN CALCIUM 20 MG/1
20 TABLET, FILM COATED ORAL DAILY
Qty: 90 TABLET | Refills: 3 | Status: SHIPPED | OUTPATIENT
Start: 2023-06-26 | End: 2023-08-01

## 2023-06-26 RX ORDER — ROSUVASTATIN CALCIUM 10 MG/1
20 TABLET, COATED ORAL DAILY
Qty: 90 TABLET | Refills: 3 | Status: CANCELLED | OUTPATIENT
Start: 2023-06-26

## 2023-06-26 ASSESSMENT — PAIN SCALES - GENERAL: PAINLEVEL: NO PAIN (0)

## 2023-06-26 NOTE — PROGRESS NOTES
"S: Titus Gordon is a 68 year old male with DM2.  Controlled on metformin.  Recent stents, hx bypass as circulatory issues    Htn: stable on meds    Hyperlipidemia: he has lipitor at home, just assume stay on that.      Gerd: PPI long term,  Stable, fills    Anxiety: stable on zoloft, fills    Tobacco use: almost quit.  kknows it's important.    Hx alcoholism: sober since 2020    Current Outpatient Medications   Medication     aspirin (ASA) 81 MG EC tablet     atorvastatin (LIPITOR) 20 MG tablet     carvedilol (COREG) 25 MG tablet     clopidogrel (PLAVIX) 75 MG tablet     LANsoprazole (PREVACID) 30 MG DR capsule     lisinopril (ZESTRIL) 2.5 MG tablet     metFORMIN (GLUCOPHAGE XR) 500 MG 24 hr tablet     sertraline (ZOLOFT) 100 MG tablet     ofloxacin (OCUFLOX) 0.3 % ophthalmic solution     prednisoLONE acetate (PRED FORTE) 1 % ophthalmic suspension     No current facility-administered medications for this visit.        O:/76   Pulse 76   Temp 98.5  F (36.9  C) (Tympanic)   Resp 16   Ht 1.791 m (5' 10.5\")   Wt 94.3 kg (208 lb)   SpO2 98%   BMI 29.42 kg/m     GEN: Alert and oriented, in no acute distress  CV: RRR, no murmur  RESP: lungs clear bilaterally, good effort  EXT: no edema or lesions noted in lower extremities    A;  DM2.  Controlled on metformin.  Recent stents, hx bypass as circulatory issues  Cad: see above   Htn: stable on meds  Hyperlipidemia: he has lipitor at home, just assume stay on that.    Gerd: PPI long term,  Stable, fills  Anxiety: stable on zoloft, fills  Tobacco use: almost quit.  kknows it's important.  Hx alcoholism: sober since 2020    P:  fills on meds for chronic issues as above in med list and orders.   Continue medications for medical issues as above in med list and orders      plavix for a year    Back in 6 mo for diabetes .    Tobacco Cessation Action Plan: almost there, is motivated          "

## 2023-07-12 ENCOUNTER — TELEPHONE (OUTPATIENT)
Dept: FAMILY MEDICINE | Facility: CLINIC | Age: 68
End: 2023-07-12
Payer: MEDICARE

## 2023-07-12 NOTE — TELEPHONE ENCOUNTER
Call from Jayleen with [facility] transferred to author  Jayleen was attempting to reach the [facility] Team   Jayleen's call back: [phone]  Fax: [phone]    Jayleen calling as patient is going to have a procedure at their clinic for dental work  Patient in the office today for a consultation  Due to patient's history of a recent MI with stent placement in May   Their policy is to not do any procedure/treatment until it has been 6 months post MI/cardiac procedure     Patient states during consultation his provider had cleared him for having the procedure   [facility] is needing a letter from provider stating patient is medically cleared for procedure     Robert Wheat RN

## 2023-07-13 NOTE — TELEPHONE ENCOUNTER
Dhiraj Carey (covering provider for PCP):    Would you be able to review this message below, is this letter possible? Please advise as PCP is signed out, thank you.      SIMON Harvey

## 2023-07-13 NOTE — TELEPHONE ENCOUNTER
Unfortunately there is nothing in Dr. Rick's note that they talked about this, or that he was cleared for any procedure. I am unable to write this letter for him.     Remi Schultz PA-C

## 2023-07-14 NOTE — TELEPHONE ENCOUNTER
[facility], unable to LM. Office closed on Fri.   Re- call Mon 7/17.  DORIE Quick RN

## 2023-07-14 NOTE — TELEPHONE ENCOUNTER
I would like them to get clearance from the cardiology group that placed the stent.  That's who has to make the call.      I assume they would want him to stop the plavix, and it's not a good idea to stop that so early after the stent, in most cases.

## 2023-07-14 NOTE — TELEPHONE ENCOUNTER
Jayleen from [facility] called back and was transferred to this RN.   RN let Jayleen know that the covering provider was not able to write a note because there was nothing about this in the notes.   RN clarified with Jayleen if is there policy, she said it is a recommendation that patients who had a heart attack wait 6 months for dental procedures unless there is a letter of medical clearance from the provider.     Notified Jayleen that this will need to be addressed once Dr. Rick is back in clinic. This is okay to wait for that per Jayleen. Please call her back once there is a response from Dr. Rick.    Citlalli Gibbs RN on 7/14/2023 at 8:59 AM

## 2023-07-15 ENCOUNTER — MYC MEDICAL ADVICE (OUTPATIENT)
Dept: FAMILY MEDICINE | Facility: CLINIC | Age: 68
End: 2023-07-15
Payer: MEDICARE

## 2023-07-18 NOTE — TELEPHONE ENCOUNTER
[facility] notified and understood. Will contact pt to find out who is Cardiologist is.   Tracee [place] Sec

## 2023-08-01 ENCOUNTER — OFFICE VISIT (OUTPATIENT)
Dept: CARDIOLOGY | Facility: CLINIC | Age: 68
End: 2023-08-01
Payer: MEDICARE

## 2023-08-01 VITALS
HEART RATE: 66 BPM | SYSTOLIC BLOOD PRESSURE: 106 MMHG | WEIGHT: 209 LBS | BODY MASS INDEX: 29.56 KG/M2 | OXYGEN SATURATION: 95 % | RESPIRATION RATE: 12 BRPM | DIASTOLIC BLOOD PRESSURE: 54 MMHG

## 2023-08-01 DIAGNOSIS — I25.5 ISCHEMIC CARDIOMYOPATHY: Primary | ICD-10-CM

## 2023-08-01 DIAGNOSIS — I25.810 CORONARY ARTERY DISEASE INVOLVING CORONARY BYPASS GRAFT OF NATIVE HEART WITHOUT ANGINA PECTORIS: ICD-10-CM

## 2023-08-01 DIAGNOSIS — I10 ESSENTIAL HYPERTENSION: ICD-10-CM

## 2023-08-01 DIAGNOSIS — E78.5 HYPERLIPIDEMIA LDL GOAL <70: ICD-10-CM

## 2023-08-01 DIAGNOSIS — E11.59 TYPE 2 DIABETES MELLITUS WITH OTHER CIRCULATORY COMPLICATION, WITHOUT LONG-TERM CURRENT USE OF INSULIN (H): ICD-10-CM

## 2023-08-01 DIAGNOSIS — E78.1 HYPERTRIGLYCERIDEMIA: ICD-10-CM

## 2023-08-01 DIAGNOSIS — Z87.891 PERSONAL HISTORY OF TOBACCO USE, PRESENTING HAZARDS TO HEALTH: ICD-10-CM

## 2023-08-01 PROCEDURE — 93000 ELECTROCARDIOGRAM COMPLETE: CPT | Performed by: INTERNAL MEDICINE

## 2023-08-01 PROCEDURE — 99205 OFFICE O/P NEW HI 60 MIN: CPT | Performed by: GENERAL ACUTE CARE HOSPITAL

## 2023-08-01 RX ORDER — ATORVASTATIN CALCIUM 80 MG/1
80 TABLET, FILM COATED ORAL DAILY
Qty: 90 TABLET | Refills: 3 | Status: SHIPPED | OUTPATIENT
Start: 2023-08-01 | End: 2023-12-18

## 2023-08-01 RX ORDER — VITAMIN E 268 MG
400 CAPSULE ORAL DAILY
COMMUNITY

## 2023-08-01 NOTE — LETTER
8/1/2023    Vinicius Rick MD  [address] [place] [id number]    RE: Titus Gordon       Dear Colleague,     I had the pleasure of seeing Titus Gordon in the [facility].  HEART CARE ENCOUNTER NOTE      Thank you, Vinicius Kebede, for asking the [facility] team to see Mr. Titus Gordon to establish care for coronary artery disease.    Assessment/Recommendations   Assessment:    Coronary artery disease status post four-vessel coronary artery bypass grafting (left internal mammary artery to the left anterior descending artery, right internal mammary artery to an obtuse marginal artery branch, saphenous venous grafts to an obtuse marginal/diagonal artery branch and the distal right coronary artery) in 2014 done at [facility] in [place], [place]. He subsequently developed a non-ST elevation myocardial infarction requiring Danial drug-eluting stenting x2 to the origin and insertion of the saphenous venous graft to the obtuse marginal/diagonal artery branch 5/16/2023. He denies angina.  Ischemic cardiomyopathy with left ventricular ejection fraction 40%. No signs or symptoms of congestive heart failure.  Essential hypertension. Controlled.  Hyperlipidemia. Last LDL 93 mg/dL.  Non insulin-dependent diabetes mellitus type 2. Last hemoglobin A1c 7.1%.  Tobacco use.  Body mass index is 29.56 kg/m .    Plan:  Increase atorvastatin to 80 mg daily.  Continue dual antiplatelet therapy with aspirin 81 mg and clopidogrel 75 mg daily for at least 12 months (end date 5/15/2024).  If his elective oral surgery can be performed while continuing dual antiplatelet therapy, it can be performed at any time. If dual antiplatelet therapy needs to be temporarily interrupted, ideally this should wait at least 6 months post percutaneous coronary intervention (11/16/2023) although the risk of acute stent thrombosis is less with Danial drug-eluting stents when prematurely  stopping dual antiplatelet therapy.  Carvedilol 25 mg twice daily.  Lisinopril 2.5 mg daily.  I suggested he enroll at cardiac rehabilitation through the [facility] clinic but he declined.  Encouraged him to quit smoking.  Follow-up with me in 6 months.       A total time of 60 minutes was spent on the date of this encounter.    History of Present Illness   Mr. Titus Gordon is a 68 year old male with a significant past history of CAD s/p 4V CABG (LIMA to LAD, SARIAH to OM branch, SVG to OM/diagonal branch, and SVG to distal RCA) in 2014 done at [facility] in [place], [place] with subsequent NSTEMI s/p Kevin TRA x2 to the origin and insertion of the SVG to OM/diagonal artery 5/16/2023, ischemic cardiomyopathy with LVEF 40%, HTN, HLD, NIDDM2 and tobacco use presenting to establish care in general cardiology.    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 Before his recent NSTEMI, he felt pressure across his chest and tingling down his left arm. He has felt fine since his PCI. No chest pain/pressure/tightness, shortness of breath at rest or with exertion, light headedness/dizziness, pre-syncope, syncope, lower extremity swelling, palpitations, paroxysmal nocturnal dyspnea (PND), or orthopnea.    He did not do any cardiac rehab but is lifting weights and using a recumbent bicycle at home.  He still smokes 3/4 ppd. He is hoping to get an oral surgery done in the near future.     Cardiac Problems and Cardiac Diagnostics     Most Recent Cardiac testing:  ECG dated 8/1/2023 (personaly reviewed and interpreted): SR with 1st degree AV block  ms, otherwise normal    ECHO 5/15/2023 (report reviewed):   NO LV mural thrombus detection.   Ejection Fraction is estimated at 40 % using biplane method with wall motion abnormalities at rest.   No mural thrombus detected.   Normal chamber  dimensions and thickness.   Grade I diastolic dysfunction - Impaired Relaxation .   Normal right ventricle structure and function.   Mild Mitral regurgitation.   Mild tricuspid regurgitation.   RVSP 34 mmHg.   Mildly enlarged Left atrium   No evidence of pericardial effusion.     Cardiac cath 5/15/2023 (report reviewed):  Severe triple native coronary vessel disease   Patent SARIAH to OM   Patent LIMA to LAD with significant subclavian stenosis noted on angiogram.   SVG to diagonal with ostial and proximal 90% ulcerated stenosis that appears to be the culprit lesion   SVG to RCA appears to be occluded with left-to-right collaterals into the PDA and PLV with good flow.   Small distal aorta is aneurysmal   Right iliac ostium is calcified with some haziness.    Cardiac cath 5/16/2023 (report reviewed):  LIMA angiography   Pull back pressure gradient across proximal left subclavian artery stenosis normal  Coronary intervention, PTCA, Stent of ostial vein graft into LCx/OM   Coronary intervention, PTCA, Stent of insertion site of vein graft into LCx/OM      Medications  Allergies   Current Outpatient Medications   Medication Sig Dispense Refill    aspirin (ASA) 81 MG EC tablet TAKE 1 TABLET BY MOUTH TWICE DAILY FOR DVT PROPHYLAXIS      atorvastatin (LIPITOR) 20 MG tablet Take 1 tablet (20 mg) by mouth daily 90 tablet 3    carvedilol (COREG) 25 MG tablet Take 1 tablet (25 mg) by mouth 2 times daily 180 tablet 3    clopidogrel (PLAVIX) 75 MG tablet Take 1 tablet (75 mg) by mouth daily 90 tablet 3    LANsoprazole (PREVACID) 30 MG DR capsule Take 1 capsule (30 mg) by mouth daily 90 capsule 3    lisinopril (ZESTRIL) 2.5 MG tablet Take 1 tablet (2.5 mg) by mouth daily 90 tablet 3    metFORMIN (GLUCOPHAGE XR) 500 MG 24 hr tablet One tablet twice daily 180 tablet 3    sertraline (ZOLOFT) 100 MG tablet TAKE 2 TABLETS BY MOUTH ONCE DAILY 180 tablet 3    vitamin E (TOCOPHEROL) 400 units (180 mg) capsule Take 400 Units by mouth daily         No Known Allergies     Physical Examination Review of Systems   /54 (BP Location: Right arm, Patient Position: Sitting, Cuff Size: Adult Large)   Pulse 66   Resp 12   Wt 94.8 kg (209 lb)   SpO2 95%   BMI 29.56 kg/m    Body mass index is 29.56 kg/m .  Wt Readings from Last 3 Encounters:   08/01/23 94.8 kg (209 lb)   06/26/23 94.3 kg (208 lb)   05/23/23 93.9 kg (207 lb)       General Appearance:   Pleasant  male, appears  stated age. no acute distress, normal body habitus   ENT/Mouth: membranes moist, no apparent gingival bleeding.      EYES:  no scleral icterus, normal conjunctivae   Neck: no carotid bruits. No anterior cervical lymphadenopaty   Respiratory:   lungs are clear to auscultation, no rales or wheezing, well-healed sternal scar, equal chest wall expansion    Cardiovascular:   Regular rhythm, normal rate. Normal first and second heart sounds with no murmurs, rubs, or gallops; the carotid, radial and posterior tibial pulses are intact, Jugular venous pressure normal, no edema bilaterally    Abdomen/GI:  no organomegaly, masses, bruits, or tenderness; bowel sounds are present   Extremities: no cyanosis or clubbing   Skin: no xanthelasma, warm.    Heme/lymph/ Immunology No apparent bleeding noted.   Neurologic: Alert and oriented. normal gait, no tremors     Psychiatric: Pleasant, calm, appropriate affect.    A complete 10 system review of systems was performed and is negative except as mentioned in the HPI/subjective.         Past History   Past Medical History:   Past Medical History:   Diagnosis Date    Alcohol dependence (H)     CAD (coronary artery disease)     3-4V CABG in 2014, stent x2 to SVG to diagonal in 2023    HLD (hyperlipidemia)     HTN (hypertension)     Tobacco abuse     Type 2 diabetes mellitus (H)        Past Surgical History:   Past Surgical History:   Procedure Laterality Date    CATARACT EXTRACTION, BILATERAL  2023    CORONARY ARTERY BYPASS  2014    3-4V CABG    HERNIA  REPAIR  1973    KNEE SURGERY Left     SHOULDER SURGERY Right 2021       Family History:   Family History   Problem Relation Age of Onset    Coronary Artery Disease Maternal Grandfather     Coronary Artery Disease Paternal Grandmother       Social History:   Social History     Socioeconomic History    Marital status:      Spouse name: Not on file    Number of children: Not on file    Years of education: Not on file    Highest education level: Not on file   Occupational History    Not on file   Tobacco Use    Smoking status: Some Days     Types: Cigarettes    Smokeless tobacco: Never   Vaping Use    Vaping Use: Never used   Substance and Sexual Activity    Alcohol use: Never    Drug use: Never    Sexual activity: Yes   Other Topics Concern    Not on file   Social History Narrative    Not on file     Social Determinants of Health     Financial Resource Strain: Not on file   Food Insecurity: Not on file   Transportation Needs: Not on file   Physical Activity: Not on file   Stress: Not on file   Social Connections: Not on file   Intimate Partner Violence: Not on file   Housing Stability: Not on file              Lab Results    Chemistry/lipid CBC Cardiac Enzymes/BNP/TSH/INR   Lab Results   Component Value Date    CHOL 193 07/11/2022    HDL 35 (L) 07/11/2022     07/11/2022    TRIG 291 (H) 07/11/2022    CR 1.20 (H) 05/23/2023    BUN 27.9 (H) 05/23/2023    POTASSIUM 4.5 05/23/2023     05/23/2023    CO2 20 (L) 05/23/2023      Lab Results   Component Value Date    A1C 7.1 (H) 05/23/2023     Lab Results   Component Value Date    A1C 7.1 (H) 05/23/2023    Lab Results   Component Value Date    TSH 1.27 07/11/2022          Andres Becerril MD [facility]  Non-Invasive Cardiologist  [facility]  Pager [phone]      Thank you for allowing me to participate in the care of your patient.      Sincerely,     Andres Becerril MD     [facility]  cc:    Silvia Raymond MD  [address] [place] [id number]

## 2023-08-01 NOTE — PATIENT INSTRUCTIONS
We will increase your atorvastatin to 80 mg a day.  If you are interested in cardiac rehabilitation, we can refer you to the program at [place].  Keep working at cutting back on smoking.  Okay to have oral surgery at any time if you can stay on your blood thinners for it, otherwise you would need to wait at least 6 months after your stents were placed if they need to temporarily stop your blood thinners.  See me back in 6 months.

## 2023-08-01 NOTE — PROGRESS NOTES
HEART CARE ENCOUNTER NOTE      Thank you, Vinicius Kebede, for asking the [facility] team to see Mr. Titus Gordon to establish care for coronary artery disease.    Assessment/Recommendations   Assessment:    Coronary artery disease status post four-vessel coronary artery bypass grafting (left internal mammary artery to the left anterior descending artery, right internal mammary artery to an obtuse marginal artery branch, saphenous venous grafts to an obtuse marginal/diagonal artery branch and the distal right coronary artery) in 2014 done at [facility] in [place], [place]. He subsequently developed a non-ST elevation myocardial infarction requiring Hatfield drug-eluting stenting x2 to the origin and insertion of the saphenous venous graft to the obtuse marginal/diagonal artery branch 5/16/2023. He denies angina.  Ischemic cardiomyopathy with left ventricular ejection fraction 40%. No signs or symptoms of congestive heart failure.  Essential hypertension. Controlled.  Hyperlipidemia. Last LDL 93 mg/dL.  Non insulin-dependent diabetes mellitus type 2. Last hemoglobin A1c 7.1%.  Tobacco use.  Body mass index is 29.56 kg/m .    Plan:  Increase atorvastatin to 80 mg daily.  Continue dual antiplatelet therapy with aspirin 81 mg and clopidogrel 75 mg daily for at least 12 months (end date 5/15/2024).  If his elective oral surgery can be performed while continuing dual antiplatelet therapy, it can be performed at any time. If dual antiplatelet therapy needs to be temporarily interrupted, ideally this should wait at least 6 months post percutaneous coronary intervention (11/16/2023) although the risk of acute stent thrombosis is less with Hatfield drug-eluting stents when prematurely stopping dual antiplatelet therapy.  Carvedilol 25 mg twice daily.  Lisinopril 2.5 mg daily.  I suggested he enroll at cardiac rehabilitation through the [facility] but he  declined.  Encouraged him to quit smoking.  Follow-up with me in 6 months.       A total time of 60 minutes was spent on the date of this encounter.    History of Present Illness   Mr. Titus Gordon is a 68 year old male with a significant past history of CAD s/p 4V CABG (LIMA to LAD, SARIAH to OM branch, SVG to OM/diagonal branch, and SVG to distal RCA) in 2014 done at [facility] in [place], [place] with subsequent NSTEMI s/p Danial TRA x2 to the origin and insertion of the SVG to OM/diagonal artery 5/16/2023, ischemic cardiomyopathy with LVEF 40%, HTN, HLD, NIDDM2 and tobacco use presenting to establish care in general cardiology.    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 Before his recent NSTEMI, he felt pressure across his chest and tingling down his left arm. He has felt fine since his PCI. No chest pain/pressure/tightness, shortness of breath at rest or with exertion, light headedness/dizziness, pre-syncope, syncope, lower extremity swelling, palpitations, paroxysmal nocturnal dyspnea (PND), or orthopnea.    He did not do any cardiac rehab but is lifting weights and using a recumbent bicycle at home.  He still smokes 3/4 ppd. He is hoping to get an oral surgery done in the near future.    Outside records indicate that on 2/9/2011 he underwent 4V CABG (LIMA to LAD, SARIAH to OM, rSVG to RPDA, rSVG to diagonal).     Cardiac Problems and Cardiac Diagnostics     Most Recent Cardiac testing:  ECG dated 8/1/2023 (personaly reviewed and interpreted): SR with 1st degree AV block  ms, otherwise normal    ECHO 5/15/2023 (report reviewed):   NO LV mural thrombus detection.   Ejection Fraction is estimated at 40 % using biplane method with wall motion abnormalities at rest.   No mural thrombus detected.   Normal chamber dimensions and thickness.   Grade I diastolic dysfunction - Impaired  Relaxation .   Normal right ventricle structure and function.   Mild Mitral regurgitation.   Mild tricuspid regurgitation.   RVSP 34 mmHg.   Mildly enlarged Left atrium   No evidence of pericardial effusion.     Cardiac cath 5/15/2023 (report reviewed):  Severe triple native coronary vessel disease   Patent SARIAH to OM   Patent LIMA to LAD with significant subclavian stenosis noted on angiogram.   SVG to diagonal with ostial and proximal 90% ulcerated stenosis that appears to be the culprit lesion   SVG to RCA appears to be occluded with left-to-right collaterals into the PDA and PLV with good flow.   Small distal aorta is aneurysmal   Right iliac ostium is calcified with some haziness.    Cardiac cath 5/16/2023 (report reviewed):  LIMA angiography   Pull back pressure gradient across proximal left subclavian artery stenosis normal  Coronary intervention, PTCA, Stent of ostial vein graft into LCx/OM   Coronary intervention, PTCA, Stent of insertion site of vein graft into LCx/OM      Medications  Allergies   Current Outpatient Medications   Medication Sig Dispense Refill    aspirin (ASA) 81 MG EC tablet TAKE 1 TABLET BY MOUTH TWICE DAILY FOR DVT PROPHYLAXIS      atorvastatin (LIPITOR) 20 MG tablet Take 1 tablet (20 mg) by mouth daily 90 tablet 3    carvedilol (COREG) 25 MG tablet Take 1 tablet (25 mg) by mouth 2 times daily 180 tablet 3    clopidogrel (PLAVIX) 75 MG tablet Take 1 tablet (75 mg) by mouth daily 90 tablet 3    LANsoprazole (PREVACID) 30 MG DR capsule Take 1 capsule (30 mg) by mouth daily 90 capsule 3    lisinopril (ZESTRIL) 2.5 MG tablet Take 1 tablet (2.5 mg) by mouth daily 90 tablet 3    metFORMIN (GLUCOPHAGE XR) 500 MG 24 hr tablet One tablet twice daily 180 tablet 3    sertraline (ZOLOFT) 100 MG tablet TAKE 2 TABLETS BY MOUTH ONCE DAILY 180 tablet 3    vitamin E (TOCOPHEROL) 400 units (180 mg) capsule Take 400 Units by mouth daily        No Known Allergies     Physical Examination Review of Systems    /54 (BP Location: Right arm, Patient Position: Sitting, Cuff Size: Adult Large)   Pulse 66   Resp 12   Wt 94.8 kg (209 lb)   SpO2 95%   BMI 29.56 kg/m    Body mass index is 29.56 kg/m .  Wt Readings from Last 3 Encounters:   08/01/23 94.8 kg (209 lb)   06/26/23 94.3 kg (208 lb)   05/23/23 93.9 kg (207 lb)       General Appearance:   Pleasant  male, appears  stated age. no acute distress, normal body habitus   ENT/Mouth: membranes moist, no apparent gingival bleeding.      EYES:  no scleral icterus, normal conjunctivae   Neck: no carotid bruits. No anterior cervical lymphadenopaty   Respiratory:   lungs are clear to auscultation, no rales or wheezing, well-healed sternal scar, equal chest wall expansion    Cardiovascular:   Regular rhythm, normal rate. Normal first and second heart sounds with no murmurs, rubs, or gallops; the carotid, radial and posterior tibial pulses are intact, Jugular venous pressure normal, no edema bilaterally    Abdomen/GI:  no organomegaly, masses, bruits, or tenderness; bowel sounds are present   Extremities: no cyanosis or clubbing   Skin: no xanthelasma, warm.    Heme/lymph/ Immunology No apparent bleeding noted.   Neurologic: Alert and oriented. normal gait, no tremors     Psychiatric: Pleasant, calm, appropriate affect.    A complete 10 system review of systems was performed and is negative except as mentioned in the HPI/subjective.         Past History   Past Medical History:   Past Medical History:   Diagnosis Date    Alcohol dependence (H)     CAD (coronary artery disease)     3-4V CABG in 2014, stent x2 to SVG to diagonal in 2023    HLD (hyperlipidemia)     HTN (hypertension)     Tobacco abuse     Type 2 diabetes mellitus (H)        Past Surgical History:   Past Surgical History:   Procedure Laterality Date    CATARACT EXTRACTION, BILATERAL  2023    CORONARY ARTERY BYPASS  2014    3-4V CABG    HERNIA REPAIR  1973    KNEE SURGERY Left     SHOULDER SURGERY Right 2021        Family History:   Family History   Problem Relation Age of Onset    Coronary Artery Disease Maternal Grandfather     Coronary Artery Disease Paternal Grandmother       Social History:   Social History     Socioeconomic History    Marital status:      Spouse name: Not on file    Number of children: Not on file    Years of education: Not on file    Highest education level: Not on file   Occupational History    Not on file   Tobacco Use    Smoking status: Some Days     Types: Cigarettes    Smokeless tobacco: Never   Vaping Use    Vaping Use: Never used   Substance and Sexual Activity    Alcohol use: Never    Drug use: Never    Sexual activity: Yes   Other Topics Concern    Not on file   Social History Narrative    Not on file     Social Determinants of Health     Financial Resource Strain: Not on file   Food Insecurity: Not on file   Transportation Needs: Not on file   Physical Activity: Not on file   Stress: Not on file   Social Connections: Not on file   Intimate Partner Violence: Not on file   Housing Stability: Not on file              Lab Results    Chemistry/lipid CBC Cardiac Enzymes/BNP/TSH/INR   Lab Results   Component Value Date    CHOL 193 07/11/2022    HDL 35 (L) 07/11/2022     07/11/2022    TRIG 291 (H) 07/11/2022    CR 1.20 (H) 05/23/2023    BUN 27.9 (H) 05/23/2023    POTASSIUM 4.5 05/23/2023     05/23/2023    CO2 20 (L) 05/23/2023      Lab Results   Component Value Date    A1C 7.1 (H) 05/23/2023     Lab Results   Component Value Date    A1C 7.1 (H) 05/23/2023    Lab Results   Component Value Date    TSH 1.27 07/11/2022          Andres Becerril MD [facility]  Non-Invasive Cardiologist  [facility]  Pager [phone]

## 2023-08-02 LAB
ATRIAL RATE - MUSE: 64 BPM
DIASTOLIC BLOOD PRESSURE - MUSE: NORMAL MMHG
INTERPRETATION ECG - MUSE: NORMAL
P AXIS - MUSE: 35 DEGREES
PR INTERVAL - MUSE: 214 MS
QRS DURATION - MUSE: 104 MS
QT - MUSE: 398 MS
QTC - MUSE: 410 MS
R AXIS - MUSE: 77 DEGREES
SYSTOLIC BLOOD PRESSURE - MUSE: NORMAL MMHG
T AXIS - MUSE: 78 DEGREES
VENTRICULAR RATE- MUSE: 64 BPM

## 2023-08-09 ENCOUNTER — LAB (OUTPATIENT)
Dept: LAB | Facility: CLINIC | Age: 68
End: 2023-08-09
Payer: MEDICARE

## 2023-08-09 DIAGNOSIS — I25.719 CORONARY ARTERY DISEASE INVOLVING AUTOLOGOUS VEIN CORONARY BYPASS GRAFT WITH ANGINA PECTORIS (H): ICD-10-CM

## 2023-08-09 DIAGNOSIS — I25.810 CORONARY ARTERY DISEASE INVOLVING CORONARY BYPASS GRAFT OF NATIVE HEART WITHOUT ANGINA PECTORIS: ICD-10-CM

## 2023-08-09 LAB
ANION GAP SERPL CALCULATED.3IONS-SCNC: 9 MMOL/L (ref 7–15)
BUN SERPL-MCNC: 19.5 MG/DL (ref 8–23)
CALCIUM SERPL-MCNC: 9.5 MG/DL (ref 8.8–10.2)
CHLORIDE SERPL-SCNC: 103 MMOL/L (ref 98–107)
CREAT SERPL-MCNC: 1.05 MG/DL (ref 0.67–1.17)
DEPRECATED HCO3 PLAS-SCNC: 25 MMOL/L (ref 22–29)
GFR SERPL CREATININE-BSD FRML MDRD: 77 ML/MIN/1.73M2
GLUCOSE SERPL-MCNC: 114 MG/DL (ref 70–99)
POTASSIUM SERPL-SCNC: 4.8 MMOL/L (ref 3.4–5.3)
SODIUM SERPL-SCNC: 137 MMOL/L (ref 136–145)

## 2023-08-09 PROCEDURE — 80048 BASIC METABOLIC PNL TOTAL CA: CPT

## 2023-08-09 PROCEDURE — 36415 COLL VENOUS BLD VENIPUNCTURE: CPT

## 2023-08-12 ENCOUNTER — MYC MEDICAL ADVICE (OUTPATIENT)
Dept: FAMILY MEDICINE | Facility: CLINIC | Age: 68
End: 2023-08-12
Payer: MEDICARE

## 2023-08-12 ENCOUNTER — MYC MEDICAL ADVICE (OUTPATIENT)
Dept: CARDIOLOGY | Facility: CLINIC | Age: 68
End: 2023-08-12
Payer: MEDICARE

## 2023-08-12 DIAGNOSIS — I25.810 CORONARY ARTERY DISEASE INVOLVING CORONARY BYPASS GRAFT OF NATIVE HEART WITHOUT ANGINA PECTORIS: ICD-10-CM

## 2023-08-14 RX ORDER — CLOPIDOGREL BISULFATE 75 MG/1
75 TABLET ORAL DAILY
Qty: 90 TABLET | Refills: 2 | Status: SHIPPED | OUTPATIENT
Start: 2023-08-14 | End: 2024-09-16

## 2023-09-10 ENCOUNTER — HEALTH MAINTENANCE LETTER (OUTPATIENT)
Age: 68
End: 2023-09-10

## 2023-10-03 ENCOUNTER — MYC MEDICAL ADVICE (OUTPATIENT)
Dept: FAMILY MEDICINE | Facility: CLINIC | Age: 68
End: 2023-10-03
Payer: MEDICARE

## 2023-10-03 DIAGNOSIS — K21.00 GASTROESOPHAGEAL REFLUX DISEASE WITH ESOPHAGITIS WITHOUT HEMORRHAGE: ICD-10-CM

## 2023-10-03 RX ORDER — LANSOPRAZOLE 30 MG/1
30 CAPSULE, DELAYED RELEASE ORAL DAILY
Qty: 90 CAPSULE | Refills: 2 | Status: SHIPPED | OUTPATIENT
Start: 2023-10-03 | End: 2024-06-24

## 2023-12-06 ENCOUNTER — TELEPHONE (OUTPATIENT)
Dept: FAMILY MEDICINE | Facility: CLINIC | Age: 68
End: 2023-12-06
Payer: MEDICARE

## 2023-12-06 NOTE — TELEPHONE ENCOUNTER
Patient Quality Outreach    Patient is due for the following:   Diabetes -  Diabetic Follow-Up Visit    Next Steps:   Schedule a office visit for diabetes     Type of outreach:    Phone, left message for patient/parent to call back.      Questions for provider review:    None           Laura López MA

## 2023-12-11 ASSESSMENT — ANXIETY QUESTIONNAIRES
3. WORRYING TOO MUCH ABOUT DIFFERENT THINGS: NOT AT ALL
6. BECOMING EASILY ANNOYED OR IRRITABLE: NOT AT ALL
GAD7 TOTAL SCORE: 0
2. NOT BEING ABLE TO STOP OR CONTROL WORRYING: NOT AT ALL
1. FEELING NERVOUS, ANXIOUS, OR ON EDGE: NOT AT ALL
7. FEELING AFRAID AS IF SOMETHING AWFUL MIGHT HAPPEN: NOT AT ALL
5. BEING SO RESTLESS THAT IT IS HARD TO SIT STILL: NOT AT ALL
4. TROUBLE RELAXING: NOT AT ALL
GAD7 TOTAL SCORE: 0
IF YOU CHECKED OFF ANY PROBLEMS ON THIS QUESTIONNAIRE, HOW DIFFICULT HAVE THESE PROBLEMS MADE IT FOR YOU TO DO YOUR WORK, TAKE CARE OF THINGS AT HOME, OR GET ALONG WITH OTHER PEOPLE: NOT DIFFICULT AT ALL

## 2023-12-18 ENCOUNTER — OFFICE VISIT (OUTPATIENT)
Dept: FAMILY MEDICINE | Facility: CLINIC | Age: 68
End: 2023-12-18
Payer: MEDICARE

## 2023-12-18 VITALS
BODY MASS INDEX: 29.26 KG/M2 | TEMPERATURE: 98.7 F | SYSTOLIC BLOOD PRESSURE: 138 MMHG | HEART RATE: 64 BPM | WEIGHT: 209 LBS | OXYGEN SATURATION: 96 % | DIASTOLIC BLOOD PRESSURE: 72 MMHG | RESPIRATION RATE: 18 BRPM | HEIGHT: 71 IN

## 2023-12-18 DIAGNOSIS — E11.59 TYPE 2 DIABETES MELLITUS WITH OTHER CIRCULATORY COMPLICATION, WITHOUT LONG-TERM CURRENT USE OF INSULIN (H): Primary | ICD-10-CM

## 2023-12-18 DIAGNOSIS — I10 HYPERTENSION, UNSPECIFIED TYPE: ICD-10-CM

## 2023-12-18 DIAGNOSIS — I25.810 CORONARY ARTERY DISEASE INVOLVING CORONARY BYPASS GRAFT OF NATIVE HEART WITHOUT ANGINA PECTORIS: ICD-10-CM

## 2023-12-18 DIAGNOSIS — E78.2 MIXED HYPERLIPIDEMIA: ICD-10-CM

## 2023-12-18 LAB
CHOLEST SERPL-MCNC: 281 MG/DL
FASTING STATUS PATIENT QL REPORTED: YES
HBA1C MFR BLD: 6.7 % (ref 0–5.6)
HDLC SERPL-MCNC: 28 MG/DL
LDLC SERPL CALC-MCNC: ABNORMAL MG/DL
NONHDLC SERPL-MCNC: 253 MG/DL
TRIGL SERPL-MCNC: 577 MG/DL

## 2023-12-18 PROCEDURE — 80061 LIPID PANEL: CPT | Performed by: FAMILY MEDICINE

## 2023-12-18 PROCEDURE — 99214 OFFICE O/P EST MOD 30 MIN: CPT | Performed by: FAMILY MEDICINE

## 2023-12-18 PROCEDURE — 83721 ASSAY OF BLOOD LIPOPROTEIN: CPT | Mod: 59 | Performed by: FAMILY MEDICINE

## 2023-12-18 PROCEDURE — 83036 HEMOGLOBIN GLYCOSYLATED A1C: CPT | Performed by: FAMILY MEDICINE

## 2023-12-18 PROCEDURE — 36415 COLL VENOUS BLD VENIPUNCTURE: CPT | Performed by: FAMILY MEDICINE

## 2023-12-18 RX ORDER — RESPIRATORY SYNCYTIAL VIRUS VACCINE 120MCG/0.5
0.5 KIT INTRAMUSCULAR ONCE
Qty: 1 EACH | Refills: 0 | Status: CANCELLED | OUTPATIENT
Start: 2023-12-18 | End: 2023-12-18

## 2023-12-18 RX ORDER — ATORVASTATIN CALCIUM 80 MG/1
40 TABLET, FILM COATED ORAL DAILY
COMMUNITY
Start: 2023-12-18

## 2023-12-18 ASSESSMENT — PAIN SCALES - GENERAL: PAINLEVEL: NO PAIN (0)

## 2023-12-18 NOTE — PROGRESS NOTES
"S: Titus Gordon is a 68 year old male with DM2.  Some CAD hx.  No cp or sob.  Recheck A1C.  Monotherapy metformin    Htn; stable on meds    Hyperlipidemia: statin, but didn't tolerate 80mg lipitor.  Willing to try 40mg, has a lot of pills at home    CAD: no cp or sob.  Stents in past.      Current Outpatient Medications   Medication    aspirin (ASA) 81 MG EC tablet    atorvastatin (LIPITOR) 80 MG tablet    carvedilol (COREG) 25 MG tablet    clopidogrel (PLAVIX) 75 MG tablet    LANsoprazole (PREVACID) 30 MG DR capsule    lisinopril (ZESTRIL) 2.5 MG tablet    metFORMIN (GLUCOPHAGE XR) 500 MG 24 hr tablet    sertraline (ZOLOFT) 100 MG tablet    vitamin E (TOCOPHEROL) 400 units (180 mg) capsule     No current facility-administered medications for this visit.       O:/72   Pulse 64   Temp 98.7  F (37.1  C) (Tympanic)   Resp 18   Ht 1.791 m (5' 10.5\")   Wt 94.8 kg (209 lb)   SpO2 96%   BMI 29.56 kg/m    GEN: Alert and oriented, in no acute distress  CV: RRR, no murmur  RESP: lungs clear bilaterally, good effort  Normal gait      A:  DM2.  Some CAD hx.  No cp or sob.  Stable   Htn; stable on meds  Hyperlipidemia: off statin   CAD: no cp or sob.  Stable     P: Labs ordered as appropriate for monitoring the listed medical conditions.    Back on statin, try 40mg.  Doesn't need fills today  Continue medications for medical issues as above in med list and orders      Back in 6 months for fills on meds.      "

## 2023-12-19 DIAGNOSIS — E78.2 MIXED HYPERLIPIDEMIA: Primary | ICD-10-CM

## 2023-12-19 DIAGNOSIS — I25.810 CORONARY ARTERY DISEASE INVOLVING CORONARY BYPASS GRAFT OF NATIVE HEART WITHOUT ANGINA PECTORIS: ICD-10-CM

## 2023-12-19 LAB — LDLC SERPL DIRECT ASSAY-MCNC: 153 MG/DL

## 2023-12-19 RX ORDER — ATORVASTATIN CALCIUM 40 MG/1
40 TABLET, FILM COATED ORAL DAILY
Qty: 90 TABLET | Refills: 1 | Status: SHIPPED | OUTPATIENT
Start: 2023-12-19 | End: 2024-06-20

## 2023-12-19 RX ORDER — ATORVASTATIN CALCIUM 80 MG/1
40 TABLET, FILM COATED ORAL DAILY
Status: CANCELLED | OUTPATIENT
Start: 2023-12-19

## 2023-12-19 NOTE — TELEPHONE ENCOUNTER
Pharmacist states that Titus is at the pharmacy looking for his 40 mg of Lipitor that Dr Rick approved yesterday. According to note below he is to be on 40 mg now instead of 80 mg and patient thought he had 80 mg at home to cut in half but he does not and the 80 mg Rx was canceled by previous provider. I did sent in for 40 mg tablets just now due to written order below.    From visit note yesterday:  Hyperlipidemia: statin, but didn't tolerate 80mg lipitor.  Willing to try 40mg, has a lot of pills at home.    Arielle Rossi RN

## 2023-12-20 NOTE — TELEPHONE ENCOUNTER
"Rec'd refill request for Atorvastatin 80mg daily w/ not stating \"computer shows refills cx's 11-29-23\".    Noted per Dr. Becerril's 8-1-23 visit note, Atorvastatin increased to 80mg daily - follow-up pending 2/2024 - msg sent to sched to team for follow-up.      FLP completed 12-18-23 per PCP orders - per results note instructions from PCP \"Get back on that statin!! Cholesterol is too high.\" - noted PCP sent new Rx for Atorvastatin 40mg daily.  mg  "

## 2024-04-07 ENCOUNTER — HEALTH MAINTENANCE LETTER (OUTPATIENT)
Age: 69
End: 2024-04-07

## 2024-05-20 ENCOUNTER — TELEPHONE (OUTPATIENT)
Dept: FAMILY MEDICINE | Facility: CLINIC | Age: 69
End: 2024-05-20

## 2024-05-20 NOTE — TELEPHONE ENCOUNTER
Patient Quality Outreach    Patient is due for the following:   Diabetes -  Diabetic Follow-Up Visit    Next Steps:   Patient declined follow up at this time. Patient established care at a different clinic     Type of outreach:    Phone, spoke to patient/parent. .      Questions for provider review:    None           Laura López MA

## 2024-06-03 ENCOUNTER — TELEPHONE (OUTPATIENT)
Dept: FAMILY MEDICINE | Facility: CLINIC | Age: 69
End: 2024-06-03
Payer: MEDICARE

## 2024-06-03 NOTE — TELEPHONE ENCOUNTER
Patient Quality Outreach    Patient is due for the following:   Diabetes -  Diabetic Follow-Up Visit    Next Steps:   Schedule a office visit for diabets     Type of outreach:    Patient is estabishing care at a different facility      Questions for provider review:    None           Laura López MA

## 2024-06-16 ENCOUNTER — HEALTH MAINTENANCE LETTER (OUTPATIENT)
Age: 69
End: 2024-06-16

## 2024-06-19 DIAGNOSIS — E78.2 MIXED HYPERLIPIDEMIA: ICD-10-CM

## 2024-06-20 RX ORDER — ATORVASTATIN CALCIUM 40 MG/1
40 TABLET, FILM COATED ORAL DAILY
Qty: 90 TABLET | Refills: 0 | Status: SHIPPED | OUTPATIENT
Start: 2024-06-20

## 2024-06-23 DIAGNOSIS — K21.00 GASTROESOPHAGEAL REFLUX DISEASE WITH ESOPHAGITIS WITHOUT HEMORRHAGE: ICD-10-CM

## 2024-06-23 DIAGNOSIS — I25.810 CORONARY ARTERY DISEASE INVOLVING CORONARY BYPASS GRAFT OF NATIVE HEART WITHOUT ANGINA PECTORIS: ICD-10-CM

## 2024-06-24 RX ORDER — LANSOPRAZOLE 30 MG/1
30 CAPSULE, DELAYED RELEASE ORAL DAILY
Qty: 90 CAPSULE | Refills: 1 | Status: SHIPPED | OUTPATIENT
Start: 2024-06-24

## 2024-06-24 RX ORDER — CARVEDILOL 25 MG/1
25 TABLET ORAL 2 TIMES DAILY
Qty: 180 TABLET | Refills: 1 | Status: SHIPPED | OUTPATIENT
Start: 2024-06-24

## 2024-06-25 DIAGNOSIS — I25.810 CORONARY ARTERY DISEASE INVOLVING CORONARY BYPASS GRAFT OF NATIVE HEART WITHOUT ANGINA PECTORIS: ICD-10-CM

## 2024-06-25 RX ORDER — LISINOPRIL 2.5 MG/1
2.5 TABLET ORAL DAILY
Qty: 90 TABLET | Refills: 0 | Status: SHIPPED | OUTPATIENT
Start: 2024-06-25 | End: 2024-10-07

## 2024-08-25 ENCOUNTER — HEALTH MAINTENANCE LETTER (OUTPATIENT)
Age: 69
End: 2024-08-25

## 2024-09-05 DIAGNOSIS — E11.59 TYPE 2 DIABETES MELLITUS WITH OTHER CIRCULATORY COMPLICATION, WITHOUT LONG-TERM CURRENT USE OF INSULIN (H): ICD-10-CM

## 2024-09-06 NOTE — TELEPHONE ENCOUNTER
Pending Prescriptions:                       Disp   Refills    metFORMIN (GLUCOPHAGE XR) 500 MG 24 hr tab*180 ta*0        Sig: Take 1 tablet by mouth twice daily    Routing refill request to provider for review/approval because:  Requested Prescriptions   Pending Prescriptions Disp Refills    metFORMIN (GLUCOPHAGE XR) 500 MG 24 hr tablet [Pharmacy Med Name: metFORMIN HCl  MG Oral Tablet Extended Release 24 Hour] 180 tablet 0     Sig: Take 1 tablet by mouth twice daily       Biguanide Agents Failed - 9/5/2024 10:21 PM        Failed - Has GFR on file in past 12 months and most recent value is normal        Failed - Recent (6 mo) or future (90 days) visit within the authorizing provider's specialty     The patient must have completed an in-person or virtual visit within the past 6 months or has a future visit scheduled within the next 90 days with the authorizing provider s specialty.  Urgent care and e-visits do not quality as an office visit for this protocol.          Passed - Patient is age 10 or older        Passed - Patient has documented A1c within the specified period of time.     If HgbA1C is 8 or greater, it needs to be on file within the past 3 months.  If less than 8, must be on file within the past 6 months.     Recent Labs   Lab Test 12/18/23  0741   A1C 6.7*             Passed - Patient does NOT have a diagnosis of CHF.        Passed - Medication is active on med list        Passed - Medication indicated for associated diagnosis     Medication is associated with one or more of the following diagnoses:     Gestational diabetes mellitus     Hyperinsulinar obesity     Hypersecretion of ovarian androgens    Non-alcoholic fatty liver    Polycystic ovarian syndrome               Pre-diabetes (DM 2 prevention)    Type 2 diabetes mellitus     Weight gain, antipsychotic therapy-induced    Impaired fasting glucose             Lavinia Mahan RN  [facility]

## 2024-09-09 RX ORDER — METFORMIN HCL 500 MG
TABLET, EXTENDED RELEASE 24 HR ORAL
Qty: 180 TABLET | Refills: 0 | Status: SHIPPED | OUTPATIENT
Start: 2024-09-09

## 2024-09-11 ENCOUNTER — TELEPHONE (OUTPATIENT)
Dept: FAMILY MEDICINE | Facility: CLINIC | Age: 69
End: 2024-09-11
Payer: MEDICARE

## 2024-09-11 NOTE — TELEPHONE ENCOUNTER
Patient Quality Outreach    Patient is due for the following:   Diabetes -  Diabetic Follow-Up Visit    Next Steps:   Schedule a office visit for dibetes     Type of outreach:    Sent letter.      Questions for provider review:    None           Laura López MA

## 2024-09-11 NOTE — LETTER
September 11, 2024    To  Titus MA [facility] [id number]    Your team at [facility] cares about your health. We have reviewed your chart and based on our findings; we are making the following recommendations to better manage your health.     You are in particular need of attention regarding the following:     Schedule a DIABETIC FOLLOW UP appointment for Office Visit. Patients with diabetes should see their provider regularly.  Schedule a DIABETIC EYE EXAM.  This exam is done with an optometrist, annually. You can schedule this appointment with your eye doctor.  If you need a referral, please let us know.    If you have already completed these items, please contact the clinic via phone or   [facility] [employer] so your care team can review and update your records. Thank you for   choosing [facility] for your healthcare needs. For any questions,   concerns, or to schedule an appointment please contact our clinic.    Healthy Regards,      Your [facility] Care Team

## 2024-09-15 DIAGNOSIS — I25.810 CORONARY ARTERY DISEASE INVOLVING CORONARY BYPASS GRAFT OF NATIVE HEART WITHOUT ANGINA PECTORIS: ICD-10-CM

## 2024-09-16 RX ORDER — CLOPIDOGREL BISULFATE 75 MG/1
75 TABLET ORAL DAILY
Qty: 90 TABLET | Refills: 0 | Status: SHIPPED | OUTPATIENT
Start: 2024-09-16

## 2024-10-06 DIAGNOSIS — I25.810 CORONARY ARTERY DISEASE INVOLVING CORONARY BYPASS GRAFT OF NATIVE HEART WITHOUT ANGINA PECTORIS: ICD-10-CM

## 2024-10-07 RX ORDER — LISINOPRIL 2.5 MG/1
2.5 TABLET ORAL DAILY
Qty: 90 TABLET | Refills: 0 | Status: SHIPPED | OUTPATIENT
Start: 2024-10-07

## 2024-10-07 NOTE — TELEPHONE ENCOUNTER
Pending Prescriptions:                       Disp   Refills    lisinopril (ZESTRIL) 2.5 MG tablet [Pharma*90 tab*0        Sig: Take 1 tablet by mouth once daily    Routing refill request to provider for review/approval because:  Labs not current:  GFR, potassium    Potassium   Date Value Ref Range Status   08/09/2023 4.8 3.4 - 5.3 mmol/L Final   07/11/2022 4.4 3.4 - 5.3 mmol/L Final     GFR Estimate   Date Value Ref Range Status   08/09/2023 77 >60 mL/min/1.73m2 Final     Lavinia Mahan RN  [facility]

## 2024-12-22 DIAGNOSIS — I25.810 CORONARY ARTERY DISEASE INVOLVING CORONARY BYPASS GRAFT OF NATIVE HEART WITHOUT ANGINA PECTORIS: ICD-10-CM

## 2024-12-23 RX ORDER — CLOPIDOGREL BISULFATE 75 MG/1
75 TABLET ORAL DAILY
Qty: 90 TABLET | Refills: 0 | Status: SHIPPED | OUTPATIENT
Start: 2024-12-23

## 2024-12-29 ENCOUNTER — HEALTH MAINTENANCE LETTER (OUTPATIENT)
Age: 69
End: 2024-12-29

## 2025-01-14 DIAGNOSIS — I25.810 CORONARY ARTERY DISEASE INVOLVING CORONARY BYPASS GRAFT OF NATIVE HEART WITHOUT ANGINA PECTORIS: ICD-10-CM

## 2025-01-14 RX ORDER — LISINOPRIL 2.5 MG/1
2.5 TABLET ORAL DAILY
Qty: 90 TABLET | Refills: 0 | OUTPATIENT
Start: 2025-01-14

## 2025-01-14 NOTE — TELEPHONE ENCOUNTER
Overdue for needed care. Please call to schedule in person clinic appointment for BP/med check. Once appt is scheduled, route back to the pool.    Julie Behrendt RN

## 2025-01-14 NOTE — TELEPHONE ENCOUNTER
Left a voice msg for pt to call back and schedule a in person visit for Med check.  Katey Hu CMA (BRETT)   (aka: Alie Hu)

## 2025-01-15 DIAGNOSIS — I25.810 CORONARY ARTERY DISEASE INVOLVING CORONARY BYPASS GRAFT OF NATIVE HEART WITHOUT ANGINA PECTORIS: ICD-10-CM

## 2025-01-15 RX ORDER — LISINOPRIL 2.5 MG/1
2.5 TABLET ORAL DAILY
Qty: 90 TABLET | Refills: 0 | OUTPATIENT
Start: 2025-01-15

## 2025-01-15 NOTE — TELEPHONE ENCOUNTER
No longer under provider care, refill request denied and pharmacy notified now has established care at :    [address] [id number] [phone]  Hero Dowell MD   [address] [place] [address] [id number] [phone] (Work)   [phone] (Fax)

## 2025-04-16 DIAGNOSIS — I25.810 CORONARY ARTERY DISEASE INVOLVING CORONARY BYPASS GRAFT OF NATIVE HEART WITHOUT ANGINA PECTORIS: ICD-10-CM

## 2025-04-16 RX ORDER — CLOPIDOGREL BISULFATE 75 MG/1
75 TABLET ORAL DAILY
Qty: 90 TABLET | Refills: 0 | OUTPATIENT
Start: 2025-04-16

## 2025-04-16 NOTE — TELEPHONE ENCOUNTER
No longer under provider care, pharmacy has been notified numerous times. Now is being seen with Yoseph in .      [address]   [place] [address]   [phone]  Hero Dowell MD   [facility] [place], [place] [id number] [phone] (Work)   [phone] (Fax)    Julie Behrendt RN

## 2025-04-19 ENCOUNTER — HEALTH MAINTENANCE LETTER (OUTPATIENT)
Age: 70
End: 2025-04-19

## 2025-08-02 ENCOUNTER — HEALTH MAINTENANCE LETTER (OUTPATIENT)
Age: 70
End: 2025-08-02